# Patient Record
Sex: MALE | Race: WHITE | Employment: UNEMPLOYED | URBAN - METROPOLITAN AREA
[De-identification: names, ages, dates, MRNs, and addresses within clinical notes are randomized per-mention and may not be internally consistent; named-entity substitution may affect disease eponyms.]

---

## 2017-02-10 ENCOUNTER — TELEPHONE (OUTPATIENT)
Dept: FAMILY MEDICINE | Facility: CLINIC | Age: 22
End: 2017-02-10

## 2017-02-13 NOTE — PATIENT INSTRUCTIONS
Preventive Health Recommendations  Male Ages 18 - 25       *    For the acne, try a lotion on the face and back. Give this about 6 weeks to work.     *    Safety issues.     Yearly exam:             See your health care provider every year in order to  o   Review health changes.   o   Discuss preventive care.    o   Review your medicines if your doctor has prescribed any.    You should be tested each year for STDs (sexually transmitted diseases).     Talk to your provider about cholesterol testing.      If you are at risk for diabetes, you should have a diabetes test (fasting glucose).    Shots: Get a flu shot each year. Get a tetanus shot every 10 years.     Nutrition:    Eat at least 5 servings of fruits and vegetables daily.     Eat whole-grain bread, whole-wheat pasta and brown rice instead of white grains and rice.     Talk to your provider about calcium and Vitamin D.     Lifestyle    Exercise for at least 150 minutes a week (30 minutes a day, 5 days a week). This will help you control your weight and prevent disease.     Limit alcohol to one drink per day.     No smoking.     Wear sunscreen to prevent skin cancer.     See your dentist every six months for an exam and cleaning.

## 2017-02-13 NOTE — PROGRESS NOTES
SUBJECTIVE:     CC: Heriberto Dennison is an 21 year old male who presents for preventative health visit.     Healthy Habits:    Do you get at least three servings of calcium containing foods daily (dairy, green leafy vegetables, etc.)? Yes    Amount of exercise or daily activities, outside of work: 3 day(s) per week    Problems taking medications regularly? Not applicable    Medication side effects: No    Have you had an eye exam in the past two years? Yes    Do you see a dentist twice per year? Yes    Do you have sleep apnea, excessive snoring or daytime drowsiness? Yes- when laying on his back.    Today's PHQ-2 Score:   PHQ-2 ( 1999 Pfizer) 2/21/2017 1/12/2015   Q1: Little interest or pleasure in doing things 0 0   Q2: Feeling down, depressed or hopeless 0 0   PHQ-2 Score 0 0     Abuse: Current or Past(Physical, Sexual or Emotional)- No  Do you feel safe in your environment- Yes    Social History   Substance Use Topics     Smoking status: Passive Smoke Exposure - Never Smoker     Smokeless tobacco: Not on file     Alcohol use No     The patient does not drink >3 drinks per day nor >7 drinks per week.    Last PSA: No results found for: PSA    No results for input(s): CHOL, HDL, LDL, TRIG, CHOLHDLRATIO, NHDL in the last 24407 hours.    Reviewed orders with patient. Reviewed health maintenance and updated orders accordingly - Yes    All Histories reviewed and updated in Epic.    ROS:    C: NEGATIVE for fever, chills, change in weight.  SKIN: POSITIVE for acne. NEGATIVE for suspicious moles or lesions.  ENT: NEGATIVE for ear, mouth and throat problems.  R: NEGATIVE for significant cough or SOB.  CV: NEGATIVE for chest pain, palpitations or peripheral edema.  GI: NEGATIVE for nausea, abdominal pain, heartburn, or change in bowel habits.  M: NEGATIVE for significant arthralgias or myalgia.  N: NEGATIVE for weakness, dizziness or paresthesias.    This document serves as a record of the services and decisions personally  "performed and made by Kate Villarreal MD. It was created on his behalf by Noa Clifford, a trained medical scribe. The creation of this document is based the provider's statements to the medical scribe.  Noa Clifford 2:21 PM February 21, 2017    OBJECTIVE:     /72 (BP Location: Left arm, Patient Position: Chair, Cuff Size: Adult Regular)  Pulse 60  Temp 99  F (37.2  C) (Tympanic)  Ht 1.708 m (5' 7.25\")  Wt 75.8 kg (167 lb)  BMI 25.96 kg/m2     EXAM:    GENERAL: healthy, alert and no distress.  EYES: Eyes grossly normal to inspection, PERRL and conjunctivae and sclerae normal.  HENT: ear canals and TM's normal.  RESP: lungs clear to auscultation - no rales, rhonchi or wheezes.  CV: regular rate and rhythm, normal S1 S2, no murmur, no peripheral edema.  MS: no gross musculoskeletal defects noted, no edema.  SKIN: scattered inflammatory pustules seen on face and back. Mild cystic acne noted.   NEURO: Normal strength and tone, mentation intact and speech normal.    ASSESSMENT/PLAN:       (Z00.00) Routine general medical examination at a health care facility  (primary encounter diagnosis)  Comment: Pt is overall healthy.  Plan: Next annual physical in one year.    (Z11.3) Screen for STD (sexually transmitted disease)  Comment: Routine screening.  Plan: Chlamydia trachomatis PCR          (Z23) Need for prophylactic vaccination and inoculation against influenza  Plan: FLU VAC, SPLIT VIRUS IM > 3 YO (QUADRIVALENT)         [72503], Vaccine Administration, Initial         [26204]    (L70.9) Acne, unspecified acne type  Comment: Use prescribed lotion on face and back. Allow approximately 6 weeks for observable improvement.  Plan: clindamycin (CLEOCIN T) 1 % solution          COUNSELING:  Reviewed preventive health counseling, as reflected in patient instructions       Regular exercise       Healthy diet/nutrition       Safe sex practices/STD prevention         reports that he is a non-smoker but has been exposed to " "tobacco smoke. He does not have any smokeless tobacco history on file.    Estimated body mass index is 25.96 kg/(m^2) as calculated from the following:    Height as of this encounter: 1.708 m (5' 7.25\").    Weight as of this encounter: 75.8 kg (167 lb).       Counseling Resources:  ATP IV Guidelines  Pooled Cohorts Equation Calculator  FRAX Risk Assessment  ICSI Preventive Guidelines  Dietary Guidelines for Americans, 2010  USDA's MyPlate  ASA Prophylaxis  Lung CA Screening    Patient will follow-up in one year, or sooner PRN. Patient instructed to call with any questions or concerns.    Patient Instructions     Preventive Health Recommendations  Male Ages 18 - 25       *    For the acne, try a lotion on the face and back. Give this about 6 weeks to work.     *    Safety issues.     Yearly exam:             See your health care provider every year in order to  o   Review health changes.   o   Discuss preventive care.    o   Review your medicines if your doctor has prescribed any.    You should be tested each year for STDs (sexually transmitted diseases).     Talk to your provider about cholesterol testing.      If you are at risk for diabetes, you should have a diabetes test (fasting glucose).    Shots: Get a flu shot each year. Get a tetanus shot every 10 years.     Nutrition:    Eat at least 5 servings of fruits and vegetables daily.     Eat whole-grain bread, whole-wheat pasta and brown rice instead of white grains and rice.     Talk to your provider about calcium and Vitamin D.     Lifestyle    Exercise for at least 150 minutes a week (30 minutes a day, 5 days a week). This will help you control your weight and prevent disease.     Limit alcohol to one drink per day.     No smoking.     Wear sunscreen to prevent skin cancer.     See your dentist every six months for an exam and cleaning.       The information in this document, created by a scribe for me, accurately reflects the services I personally performed " and the decisions made by me. I have reviewed and approved this document for accuracy. 2:40 PM February 21, 2017     Kate Villarreal MD  [facility]

## 2017-02-21 ENCOUNTER — OFFICE VISIT (OUTPATIENT)
Dept: FAMILY MEDICINE | Facility: CLINIC | Age: 22
End: 2017-02-21
Payer: COMMERCIAL

## 2017-02-21 VITALS
HEART RATE: 60 BPM | WEIGHT: 167 LBS | HEIGHT: 67 IN | SYSTOLIC BLOOD PRESSURE: 120 MMHG | DIASTOLIC BLOOD PRESSURE: 72 MMHG | BODY MASS INDEX: 26.21 KG/M2 | TEMPERATURE: 99 F

## 2017-02-21 DIAGNOSIS — Z00.00 ROUTINE GENERAL MEDICAL EXAMINATION AT A HEALTH CARE FACILITY: Primary | ICD-10-CM

## 2017-02-21 DIAGNOSIS — L70.9 ACNE, UNSPECIFIED ACNE TYPE: ICD-10-CM

## 2017-02-21 DIAGNOSIS — Z23 NEED FOR PROPHYLACTIC VACCINATION AND INOCULATION AGAINST INFLUENZA: ICD-10-CM

## 2017-02-21 DIAGNOSIS — Z11.3 SCREEN FOR STD (SEXUALLY TRANSMITTED DISEASE): ICD-10-CM

## 2017-02-21 PROCEDURE — 87491 CHLMYD TRACH DNA AMP PROBE: CPT | Performed by: FAMILY MEDICINE

## 2017-02-21 PROCEDURE — 90471 IMMUNIZATION ADMIN: CPT | Performed by: FAMILY MEDICINE

## 2017-02-21 PROCEDURE — 99395 PREV VISIT EST AGE 18-39: CPT | Mod: 25 | Performed by: FAMILY MEDICINE

## 2017-02-21 PROCEDURE — 90686 IIV4 VACC NO PRSV 0.5 ML IM: CPT | Performed by: FAMILY MEDICINE

## 2017-02-21 RX ORDER — CLINDAMYCIN PHOSPHATE 11.9 MG/ML
SOLUTION TOPICAL 2 TIMES DAILY
Qty: 60 ML | Refills: 11 | Status: SHIPPED | OUTPATIENT
Start: 2017-02-21

## 2017-02-21 NOTE — NURSING NOTE
"Chief Complaint   Patient presents with     Physical       Initial /72 (BP Location: Left arm, Patient Position: Chair, Cuff Size: Adult Regular)  Pulse 60  Temp 99  F (37.2  C) (Tympanic)  Ht 5' 7.25\" (1.708 m)  Wt 167 lb (75.8 kg)  BMI 25.96 kg/m2 Estimated body mass index is 25.96 kg/(m^2) as calculated from the following:    Height as of this encounter: 5' 7.25\" (1.708 m).    Weight as of this encounter: 167 lb (75.8 kg).  Medication Reconciliation: complete   HUMBERTO Van      "

## 2017-02-21 NOTE — LETTER
[address]  [address]  [phone]      March 1, 2017      Heriberto Dennison  [address] [address]              Dear Mr. Dennison,    There is no signs of the STI , sexually transmitted infection, chlamydia. Please call with any questions or concerns.       Sincerely,    Kate Villarreal MD/[facility] CMA

## 2017-02-21 NOTE — MR AVS SNAPSHOT
After Visit Summary   2/21/2017    Heriberto Dennison    MRN: [id number]           Patient Information     Date Of Birth          1995        Visit Information        Provider Department      2/21/2017 2:00 PM Kate Villarreal MD [facility]        Today's Diagnoses     Routine general medical examination at a health care facility    -  1    Screen for STD (sexually transmitted disease)        Need for prophylactic vaccination and inoculation against influenza        Acne, unspecified acne type          Care Instructions      Preventive Health Recommendations  Male Ages 18 - 25       *    For the acne, try a lotion on the face and back. Give this about 6 weeks to work.     *    Safety issues.     Yearly exam:             See your health care provider every year in order to  o   Review health changes.   o   Discuss preventive care.    o   Review your medicines if your doctor has prescribed any.    You should be tested each year for STDs (sexually transmitted diseases).     Talk to your provider about cholesterol testing.      If you are at risk for diabetes, you should have a diabetes test (fasting glucose).    Shots: Get a flu shot each year. Get a tetanus shot every 10 years.     Nutrition:    Eat at least 5 servings of fruits and vegetables daily.     Eat whole-grain bread, whole-wheat pasta and brown rice instead of white grains and rice.     Talk to your provider about calcium and Vitamin D.     Lifestyle    Exercise for at least 150 minutes a week (30 minutes a day, 5 days a week). This will help you control your weight and prevent disease.     Limit alcohol to one drink per day.     No smoking.     Wear sunscreen to prevent skin cancer.     See your dentist every six months for an exam and cleaning.           Follow-ups after your visit        Who to contact     Normal or non-critical lab and imaging results will be communicated to you by MyChart, letter or phone within 4  "business days after the clinic has received the results. If you do not hear from us within 7 days, please contact the clinic through [employer] or phone. If you have a critical or abnormal lab result, we will notify you by phone as soon as possible.  Submit refill requests through [employer] or call your pharmacy and they will forward the refill request to us. Please allow 3 business days for your refill to be completed.          If you need to speak with a  for additional information , please call: [phone]           Additional Information About Your Visit        SuperT[employer]per Information     [employer]per lets you send messages to your doctor, view your test results, renew your prescriptions, schedule appointments and more. To sign up, go to [email or web address] . Click on \"Log in\" on the left side of the screen, which will take you to the Welcome page. Then click on \"Sign up Now\" on the right side of the page.     You will be asked to enter the access code listed below, as well as some personal information. Please follow the directions to create your username and password.     Your access code is: [email or web address]  Expires: 2017  2:27 PM     Your access code will  in 90 days. If you need help or a new code, please call your [place] clinic or [phone].        Care EveryWhere ID     This is your Care EveryWhere ID. This could be used by other organizations to access your [place] medical records  [id number]        Your Vitals Were     Pulse Temperature Height BMI (Body Mass Index)          60 99  F (37.2  C) (Tympanic) 5' 7.25\" (1.708 m) 25.96 kg/m2         Blood Pressure from Last 3 Encounters:   17 120/72   01/12/15 120/74   10/08/12 120/80    Weight from Last 3 Encounters:   17 167 lb (75.8 kg)   01/12/15 165 lb 8 oz (75.1 kg) (65 %)*   10/08/12 153 lb (69.4 kg) (61 %)*     * Growth percentiles are based on CDC 2-20 Years data.              We Performed the Following     Chlamydia " trachomatis PCR     FLU VAC, SPLIT VIRUS IM > 3 YO (QUADRIVALENT) [86654]     Vaccine Administration, Initial [09594]          Today's Medication Changes          These changes are accurate as of: 2/21/17  2:27 PM.  If you have any questions, ask your nurse or doctor.               Start taking these medicines.        Dose/Directions    clindamycin 1 % solution   Commonly known as:  CLEOCIN T   Used for:  Acne, unspecified acne type   Started by:  Kate Villarreal MD        Apply topically 2 times daily   Quantity:  60 mL   Refills:  11            Where to get your medicines      These medications were sent to [facility] [address] [facility]  [address] [place] [address]     Phone:  [phone]     clindamycin 1 % solution                Primary Care Provider Office Phone # Fax #    Adrian Ferrera [phone]       [facility] [address] [place] [address]        Thank you!     Thank you for choosing [facility]  for your care. Our goal is always to provide you with excellent care. Hearing back from our patients is one way we can continue to improve our services. Please take a few minutes to complete the written survey that you may receive in the mail after your visit with us. Thank you!             Your Updated Medication List - Protect others around you: Learn how to safely use, store and throw away your medicines at www.disposemymeds.org.          This list is accurate as of: 2/21/17  2:27 PM.  Always use your most recent med list.                   Brand Name Dispense Instructions for use    clindamycin 1 % solution    CLEOCIN T    60 mL    Apply topically 2 times daily

## 2017-02-21 NOTE — PROGRESS NOTES
Injectable Influenza Immunization Documentation    1.  Is the person to be vaccinated sick today?  No    2. Does the person to be vaccinated have an allergy to eggs or to a component of the vaccine?  No    3. Has the person to be vaccinated today ever had a serious reaction to influenza vaccine in the past?  No    4. Has the person to be vaccinated ever had Guillain-[place] syndrome?  No     Form completed by Carlota cma

## 2017-02-22 LAB
C TRACH DNA SPEC QL NAA+PROBE: NORMAL
SPECIMEN SOURCE: NORMAL

## 2018-01-23 ENCOUNTER — OFFICE VISIT (OUTPATIENT)
Dept: FAMILY MEDICINE | Facility: CLINIC | Age: 23
End: 2018-01-23
Payer: COMMERCIAL

## 2018-01-23 VITALS
DIASTOLIC BLOOD PRESSURE: 82 MMHG | WEIGHT: 179 LBS | BODY MASS INDEX: 28.09 KG/M2 | TEMPERATURE: 98.1 F | SYSTOLIC BLOOD PRESSURE: 120 MMHG | HEIGHT: 67 IN | HEART RATE: 68 BPM

## 2018-01-23 DIAGNOSIS — H61.23 BILATERAL IMPACTED CERUMEN: ICD-10-CM

## 2018-01-23 DIAGNOSIS — Z23 NEED FOR PROPHYLACTIC VACCINATION AND INOCULATION AGAINST INFLUENZA: ICD-10-CM

## 2018-01-23 DIAGNOSIS — H93.8X3 EAR FULLNESS, BILATERAL: Primary | ICD-10-CM

## 2018-01-23 PROCEDURE — 90471 IMMUNIZATION ADMIN: CPT | Performed by: FAMILY MEDICINE

## 2018-01-23 PROCEDURE — 69209 REMOVE IMPACTED EAR WAX UNI: CPT | Mod: 50 | Performed by: FAMILY MEDICINE

## 2018-01-23 PROCEDURE — 90686 IIV4 VACC NO PRSV 0.5 ML IM: CPT | Performed by: FAMILY MEDICINE

## 2018-01-23 PROCEDURE — 99213 OFFICE O/P EST LOW 20 MIN: CPT | Mod: 25 | Performed by: FAMILY MEDICINE

## 2018-01-23 NOTE — PROGRESS NOTES

## 2018-01-23 NOTE — MR AVS SNAPSHOT
After Visit Summary   1/23/2018    Heriberto Dennison    MRN: [id number]           Patient Information     Date Of Birth          1995        Visit Information        Provider Department      1/23/2018 4:00 PM Kate Villarreal MD [facility]        Today's Diagnoses     Ear fullness, bilateral    -  1    Bilateral impacted cerumen        Need for prophylactic vaccination and inoculation against influenza           Follow-ups after your visit        Additional Services     AUDIOLOGY ADULT REFERRAL       Your provider has referred you to: [facility] [phone]   [email or web address]    Specialty Testing:  Audiogram w/Tymps and Reflexes (Comprehensive Audiology Evaluation)            OTOLARYNGOLOGY REFERRAL       Your provider has referred you to: [facility] [phone]   [email or web address]    Please be aware that coverage of these services is subject to the terms and limitations of your health insurance plan.  Call member services at your health plan with any benefit or coverage questions.      Please bring the following with you to your appointment:    (1) Any X-Rays, CTs or MRIs which have been performed.  Contact the facility where they were done to arrange for  prior to your scheduled appointment.   (2) List of current medications  (3) This referral request   (4) Any documents/labs given to you for this referral                  Follow-up notes from your care team     Return in about 1 year (around 1/23/2019).      Who to contact     Normal or non-critical lab and imaging results will be communicated to you by MyChart, letter or phone within 4 business days after the clinic has received the results. If you do not hear from us within 7 days, please contact the clinic through MyChart or phone. If you have a critical or abnormal lab result, we will notify you by  "phone as soon as possible.  Submit refill requests through 139[employer] or call your pharmacy and they will forward the refill request to us. Please allow 3 business days for your refill to be completed.          If you need to speak with a  for additional information , please call: [phone]           Additional Information About Your Visit        139shop Information     139shop lets you send messages to your doctor, view your test results, renew your prescriptions, schedule appointments and more. To sign up, go to [email or web address] . Click on \"Log in\" on the left side of the screen, which will take you to the Welcome page. Then click on \"Sign up Now\" on the right side of the page.     You will be asked to enter the access code listed below, as well as some personal information. Please follow the directions to create your username and password.     Your access code is: [email or web address]  Expires: 2018 10:07 AM     Your access code will  in 90 days. If you need help or a new code, please call your [place] clinic or [phone].        Care EveryWhere ID     This is your Care EveryWhere ID. This could be used by other organizations to access your [place] medical records  [id number]        Your Vitals Were     Pulse Temperature Height BMI (Body Mass Index)          68 98.1  F (36.7  C) (Tympanic) 5' 7.25\" (1.708 m) 27.83 kg/m2         Blood Pressure from Last 3 Encounters:   18 120/82   17 120/72   01/12/15 120/74    Weight from Last 3 Encounters:   18 179 lb (81.2 kg)   17 167 lb (75.8 kg)   01/12/15 165 lb 8 oz (75.1 kg) (65 %)*     * Growth percentiles are based on CDC 2-20 Years data.              We Performed the Following     AUDIOLOGY ADULT REFERRAL     FLU VAC, SPLIT VIRUS IM > 3 YO (QUADRIVALENT) [47501]     OTOLARYNGOLOGY REFERRAL     Vaccine Administration, Initial [95409]        Primary Care Provider Office Phone # Fax #    Adrian Ferrera MD " [phone]       [address] [place] [address]        Equal Access to Services     ZORA VILLAFANA : Vijay Billy, walivda eloinamaryamha, stanislavta kaalmada torincierra, waxarthur jenniferin hayaaheriberto harkinslydia marley bharti ramirez. So [facility] [phone].    ATENCIÓN: Si habla español, tiene a villalobos disposición servicios gratuitos de asistencia lingüística. Llame al [phone].    We comply with applicable federal civil rights laws and [place] laws. We do not discriminate on the basis of race, color, national origin, age, disability, sex, sexual orientation, or gender identity.            Thank you!     [facility]. Our goal is always to provide you with excellent care. Hearing back from our patients is one way we can continue to improve our services. Please take a few minutes to complete the written survey that you may receive in the mail after your visit with us. Thank you!             Your Updated Medication List - Protect others around you: Learn how to safely use, store and throw away your medicines at www.disposemymeds.org.          This list is accurate as of 1/23/18 11:59 PM.  Always use your most recent med list.                   Brand Name Dispense Instructions for use Diagnosis    clindamycin 1 % solution    CLEOCIN T    60 mL    Apply topically 2 times daily    Acne, unspecified acne type

## 2018-01-23 NOTE — PROGRESS NOTES
"  SUBJECTIVE:   Heriberto Dennison is a 22 year old male who presents to clinic today for the following health issues:      Patient describes intermittent bilateral ear pressure, ringing, and pain for several years. He is unsure if symptoms becoming worse or if he is not tolerating it as well. He notes a history of allergies with possible triggers of dust and cat dander. He has tried Claritin with some improvement in his sinus congestion and rhinorrhea. He does not notice any improvement in his ear symptoms from Claritin. He has not tried any other therapies for his ears. He has a history of a few ear infections as a child. He works as . He wears ear plugs at work. He has never been evaluated by ENT or Audiology.         Problem list, Medication list, Allergies, and Medical/Social/Surgical/Family histories reviewed in [facility] and updated as appropriate.    Labs reviewed in EPIC      ROS:  Constitutional, HEENT, cardiovascular, pulmonary, gi and gu systems are negative, except as otherwise noted.      This document serves as a record of the services and decisions personally performed and made by Kate Villarreal MD. It was created on his behalf by Carlita Bradford, a trained medical scribe. The creation of this document is based the provider's statements to the medical scribe.  Carlita Bradford 4:49 PM January 23, 2018  OBJECTIVE:     /82  Pulse 68  Temp 98.1  F (36.7  C) (Tympanic)  Ht 5' 7.25\" (1.708 m)  Wt 179 lb (81.2 kg)  BMI 27.83 kg/m2  Body mass index is 27.83 kg/(m^2).     GENERAL: Alert, well appearing, no distress  SKIN: Clear. No significant rash or lesions  EYES: Eyes grossly normal to inspection, conjunctivae and sclerae normal  EARS: Bilateral cerumen impaction, unable to visualize TMs.  LYMPH NODES: No adenopathy  LUNGS: Clear. No rales, rhonchi, wheezing or retractions  HEART: Regular rhythm. Normal S1/S2. No murmurs.    ASSESSMENT/PLAN:     (H93.8X3) Ear fullness, bilateral  (primary encounter " diagnosis)  Comment: Symptoms may be secondary to cerumen impaction or allergies.   Plan: OTOLARYNGOLOGY REFERRAL, AUDIOLOGY ADULT         REFERRAL - Ear wash done today. If symptoms not improved after ear wash today, advised follow-up with ENT and Audiology.     (H61.23) Bilateral impacted cerumen  Comment: Noted upon examination. Patient agreed to receive ear wash.  Plan: Ear Lavage was done by nursing staff and cerumen came out without difficulty.     (Z23) Need for prophylactic vaccination and inoculation against influenza  Comment: Influenza vaccine offered and accepted by patient.   Plan: Administered today - see orders.      Patient will follow up PRN. Patient instructed to call with any questions or concerns.    There are no Patient Instructions on file for this visit.    The information in this document, created by a scribe for me, accurately reflects the services I personally performed and the decisions made by me. I have reviewed and approved this document for accuracy.  5:18 PM January 23, 2018    Kate Villarreal MD  [facility]

## 2018-01-23 NOTE — NURSING NOTE
Bilateral ears irrigated using warm water and Colace until clear. Patient tolerated well.  [facility] CMA

## 2018-01-23 NOTE — NURSING NOTE
"Chief Complaint   Patient presents with     Ear Problem       Initial /82  Pulse 68  Temp 98.1  F (36.7  C) (Tympanic)  Ht 5' 7.25\" (1.708 m)  Wt 179 lb (81.2 kg)  BMI 27.83 kg/m2 Estimated body mass index is 27.83 kg/(m^2) as calculated from the following:    Height as of this encounter: 5' 7.25\" (1.708 m).    Weight as of this encounter: 179 lb (81.2 kg).  Medication Reconciliation: complete  "